# Patient Record
Sex: MALE | Race: WHITE | NOT HISPANIC OR LATINO | URBAN - METROPOLITAN AREA
[De-identification: names, ages, dates, MRNs, and addresses within clinical notes are randomized per-mention and may not be internally consistent; named-entity substitution may affect disease eponyms.]

---

## 2020-01-03 ENCOUNTER — OFFICE VISIT - RIVER FALLS (OUTPATIENT)
Dept: FAMILY MEDICINE | Facility: CLINIC | Age: 75
End: 2020-01-03

## 2022-02-12 VITALS
DIASTOLIC BLOOD PRESSURE: 80 MMHG | TEMPERATURE: 98 F | SYSTOLIC BLOOD PRESSURE: 132 MMHG | OXYGEN SATURATION: 95 % | HEART RATE: 68 BPM

## 2022-02-16 NOTE — NURSING NOTE
Comprehensive Intake Entered On:  1/3/2020 4:18 PM CST    Performed On:  1/3/2020 4:16 PM CST by Taryn Tobin               Summary   Chief Complaint :   Left side chest pain, going into back.    Additonal Information :   PCP: Dr. Sima Casiano   Systolic Blood Pressure :   132 mmHg (HI)    Diastolic Blood Pressure :   80 mmHg   Mean Arterial Pressure :   97 mmHg   Peripheral Pulse Rate :   68 bpm   Temperature Tympanic :   98.0 DegF(Converted to: 36.7 DegC)    Oxygen Saturation :   95 %   Taryn Tobin - 1/3/2020 4:16 PM CST   Meds / Allergies   (As Of: 1/3/2020 4:18:42 PM CST)     Medication List   (As Of: 1/3/2020 4:18:42 PM CST)

## 2022-02-16 NOTE — PROGRESS NOTES
"Chief Complaint    Left side chest pain, going into back.  History of Present Illness      Chief complaint as above reviewed and confirmed with patient.  Pt presents to the clinic with concerns re: chest pain.  He has a hx of htn, hyperlipidemia, psoriatic arthritis/psoriasis, \"kidney problems\", pcp Sima Casiano in [place].  Here visiting friend in the hospital.  Pain is in the L anterior chest, radiates to the back.  Pain waxes and wanes but not necessarily exertional, possible improves with rest.  No associated diaphoresis, nausea/vomiting, abdominal pain or back pain.  No cough.  Has chronic edema, denies any CHF history that he is aware of.  Denies known CAD.  Had a stress test over 20 years ago.  Denies diabetes but states he was on medication for diabetes for a short period of time then taken off because his BS were better.  Now his pcp \"watches\" his blood sugar.  He denies hx of DVT or hypercoagulability.  Pt is unclear about the medications he uses: knows he is on metroprolol, lasix, a statin, amlodipine.  does not know doses.  On an immune suppressant for his psoriatic arthritis.  Review of Systems      Review of systems is negative with the exception of those noted in HPI          Physical Exam   Vitals & Measurements    T: 98.0   F (Tympanic)  HR: 68(Peripheral)  BP: 132/80  SpO2: 95%                Vitals as above per nursing documentation           Constitutional : nad appears well          Lungs: lungs CTA', no Wheezes, rhonchi or rales, decresed at bases          Heart: heart RRR, nl S1, S2 no murmur.          skin:  No rashes        EXT: 2+ edema, stasis dermatitis and pigment changes noted.              Assessment/Plan       1. Chest pain (R07.9)         discussed with pt given his risk factors including age, gender, htn and hypercholesterolemia would recommend he be evaluated in the ED to exclude cardiac etiologies including unstable angina/ACS/dissection, PE. ETC.   Pt agreeble.  Discussed " in the ED MD as well.  To ED with stable vitals  Patient Information     Name:JAYLIN CARRERA      Address:      [address] [id number]     Sex:Male     YOB: 1945     Phone:[phone]     Emergency Contact:ADRIANA, ADRIANA     MRN:[id number]     FIN:[id number]     Location:[facility]     Date of Service:01/03/2020      Primary Care Physician:       NONE ,       Attending Physician:       Bari GREGORY, Sophia BARRETT, [phone]  Problem List/Past Medical History    Ongoing     No qualifying data    Historical     No qualifying data  Medications   No active medications  Allergies   No active allergies